# Patient Record
Sex: FEMALE | Race: WHITE | NOT HISPANIC OR LATINO
[De-identification: names, ages, dates, MRNs, and addresses within clinical notes are randomized per-mention and may not be internally consistent; named-entity substitution may affect disease eponyms.]

---

## 2019-09-30 ENCOUNTER — RECORDS - HEALTHEAST (OUTPATIENT)
Dept: ADMINISTRATIVE | Facility: OTHER | Age: 59
End: 2019-09-30

## 2019-09-30 ENCOUNTER — AMBULATORY - HEALTHEAST (OUTPATIENT)
Dept: SCHEDULING | Facility: CLINIC | Age: 59
End: 2019-09-30

## 2019-09-30 DIAGNOSIS — N95.9 UNSPECIFIED MENOPAUSAL AND PERIMENOPAUSAL DISORDER: ICD-10-CM

## 2019-09-30 DIAGNOSIS — Z78.0 POST-MENOPAUSAL: ICD-10-CM

## 2020-11-02 ENCOUNTER — ANESTHESIA - HEALTHEAST (OUTPATIENT)
Dept: SURGERY | Facility: HOSPITAL | Age: 60
End: 2020-11-02

## 2020-11-02 ENCOUNTER — RECORDS - HEALTHEAST (OUTPATIENT)
Dept: ADMINISTRATIVE | Facility: OTHER | Age: 60
End: 2020-11-02

## 2020-11-02 ENCOUNTER — SURGERY - HEALTHEAST (OUTPATIENT)
Dept: SURGERY | Facility: HOSPITAL | Age: 60
End: 2020-11-02

## 2020-11-02 ASSESSMENT — MIFFLIN-ST. JEOR
SCORE: 1081.64
SCORE: 1091.56

## 2020-11-03 ENCOUNTER — COMMUNICATION - HEALTHEAST (OUTPATIENT)
Dept: SCHEDULING | Facility: CLINIC | Age: 60
End: 2020-11-03

## 2020-11-06 ENCOUNTER — COMMUNICATION - HEALTHEAST (OUTPATIENT)
Dept: UROLOGY | Facility: CLINIC | Age: 60
End: 2020-11-06

## 2020-11-06 ENCOUNTER — AMBULATORY - HEALTHEAST (OUTPATIENT)
Dept: SCHEDULING | Facility: CLINIC | Age: 60
End: 2020-11-06

## 2020-11-06 DIAGNOSIS — B99.9 INFECTION: ICD-10-CM

## 2020-11-06 DIAGNOSIS — B96.29 UTI DUE TO EXTENDED-SPECTRUM BETA LACTAMASE (ESBL) PRODUCING ESCHERICHIA COLI: ICD-10-CM

## 2020-11-06 DIAGNOSIS — Z16.12 UTI DUE TO EXTENDED-SPECTRUM BETA LACTAMASE (ESBL) PRODUCING ESCHERICHIA COLI: ICD-10-CM

## 2020-11-06 DIAGNOSIS — N39.0 UTI DUE TO EXTENDED-SPECTRUM BETA LACTAMASE (ESBL) PRODUCING ESCHERICHIA COLI: ICD-10-CM

## 2020-11-09 ENCOUNTER — AMBULATORY - HEALTHEAST (OUTPATIENT)
Dept: PHARMACY | Facility: HOSPITAL | Age: 60
End: 2020-11-09

## 2020-11-09 ENCOUNTER — AMBULATORY - HEALTHEAST (OUTPATIENT)
Dept: UROLOGY | Facility: CLINIC | Age: 60
End: 2020-11-09

## 2020-11-09 ENCOUNTER — INFUSION - HEALTHEAST (OUTPATIENT)
Dept: INFUSION THERAPY | Facility: HOSPITAL | Age: 60
End: 2020-11-09

## 2020-11-09 ENCOUNTER — OFFICE VISIT - HEALTHEAST (OUTPATIENT)
Dept: OTHER | Facility: HOSPITAL | Age: 60
End: 2020-11-09

## 2020-11-09 DIAGNOSIS — B99.9 INFECTION: ICD-10-CM

## 2020-11-09 DIAGNOSIS — N39.0 URINARY TRACT INFECTION, SITE NOT SPECIFIED: ICD-10-CM

## 2020-11-09 DIAGNOSIS — N20.0 CALCULUS OF KIDNEY: ICD-10-CM

## 2020-11-12 ENCOUNTER — RECORDS - HEALTHEAST (OUTPATIENT)
Dept: LAB | Facility: CLINIC | Age: 60
End: 2020-11-12

## 2020-11-12 LAB
AST SERPL W P-5'-P-CCNC: 23 U/L (ref 0–40)
BASOPHILS # BLD AUTO: 0 THOU/UL (ref 0–0.2)
BASOPHILS NFR BLD AUTO: 0 % (ref 0–2)
BUN SERPL-MCNC: 11 MG/DL (ref 8–22)
C REACTIVE PROTEIN LHE: <0.1 MG/DL (ref 0–0.8)
CREAT SERPL-MCNC: 0.61 MG/DL (ref 0.6–1.1)
EOSINOPHIL # BLD AUTO: 0.2 THOU/UL (ref 0–0.4)
EOSINOPHIL NFR BLD AUTO: 2 % (ref 0–6)
ERYTHROCYTE [DISTWIDTH] IN BLOOD BY AUTOMATED COUNT: 11.4 % (ref 11–14.5)
ERYTHROCYTE [SEDIMENTATION RATE] IN BLOOD BY WESTERGREN METHOD: 22 MM/HR (ref 0–20)
GFR SERPL CREATININE-BSD FRML MDRD: >60 ML/MIN/1.73M2
HCT VFR BLD AUTO: 36.4 % (ref 35–47)
HGB BLD-MCNC: 12.1 G/DL (ref 12–16)
IMM GRANULOCYTES # BLD: 0 THOU/UL
IMM GRANULOCYTES NFR BLD: 0 %
LYMPHOCYTES # BLD AUTO: 2.1 THOU/UL (ref 0.8–4.4)
LYMPHOCYTES NFR BLD AUTO: 29 % (ref 20–40)
MCH RBC QN AUTO: 35.5 PG (ref 27–34)
MCHC RBC AUTO-ENTMCNC: 33.2 G/DL (ref 32–36)
MCV RBC AUTO: 107 FL (ref 80–100)
MONOCYTES # BLD AUTO: 0.6 THOU/UL (ref 0–0.9)
MONOCYTES NFR BLD AUTO: 8 % (ref 2–10)
NEUTROPHILS # BLD AUTO: 4.2 THOU/UL (ref 2–7.7)
NEUTROPHILS NFR BLD AUTO: 60 % (ref 50–70)
PLATELET # BLD AUTO: 201 THOU/UL (ref 140–440)
PMV BLD AUTO: 9.9 FL (ref 8.5–12.5)
RBC # BLD AUTO: 3.41 MILL/UL (ref 3.8–5.4)
WBC: 7 THOU/UL (ref 4–11)

## 2020-11-13 ENCOUNTER — AMBULATORY - HEALTHEAST (OUTPATIENT)
Dept: SURGERY | Facility: AMBULATORY SURGERY CENTER | Age: 60
End: 2020-11-13

## 2020-11-13 DIAGNOSIS — Z11.59 ENCOUNTER FOR SCREENING FOR OTHER VIRAL DISEASES: ICD-10-CM

## 2020-11-18 ENCOUNTER — AMBULATORY - HEALTHEAST (OUTPATIENT)
Dept: LAB | Facility: CLINIC | Age: 60
End: 2020-11-18

## 2020-11-18 DIAGNOSIS — Z11.59 ENCOUNTER FOR SCREENING FOR OTHER VIRAL DISEASES: ICD-10-CM

## 2020-11-19 ENCOUNTER — RECORDS - HEALTHEAST (OUTPATIENT)
Dept: LAB | Facility: CLINIC | Age: 60
End: 2020-11-19

## 2020-11-19 ENCOUNTER — ANESTHESIA - HEALTHEAST (OUTPATIENT)
Dept: SURGERY | Facility: AMBULATORY SURGERY CENTER | Age: 60
End: 2020-11-19

## 2020-11-19 LAB
AST SERPL W P-5'-P-CCNC: 20 U/L (ref 0–40)
BASOPHILS # BLD AUTO: 0.1 THOU/UL (ref 0–0.2)
BASOPHILS NFR BLD AUTO: 1 % (ref 0–2)
BUN SERPL-MCNC: 9 MG/DL (ref 8–22)
C REACTIVE PROTEIN LHE: <0.1 MG/DL (ref 0–0.8)
CREAT SERPL-MCNC: 0.53 MG/DL (ref 0.6–1.1)
EOSINOPHIL # BLD AUTO: 0.2 THOU/UL (ref 0–0.4)
EOSINOPHIL NFR BLD AUTO: 4 % (ref 0–6)
ERYTHROCYTE [DISTWIDTH] IN BLOOD BY AUTOMATED COUNT: 11.3 % (ref 11–14.5)
ERYTHROCYTE [SEDIMENTATION RATE] IN BLOOD BY WESTERGREN METHOD: 19 MM/HR (ref 0–20)
GFR SERPL CREATININE-BSD FRML MDRD: >60 ML/MIN/1.73M2
HCT VFR BLD AUTO: 37.3 % (ref 35–47)
HGB BLD-MCNC: 12.3 G/DL (ref 12–16)
IMM GRANULOCYTES # BLD: 0 THOU/UL
IMM GRANULOCYTES NFR BLD: 0 %
LYMPHOCYTES # BLD AUTO: 1.4 THOU/UL (ref 0.8–4.4)
LYMPHOCYTES NFR BLD AUTO: 29 % (ref 20–40)
MCH RBC QN AUTO: 34.8 PG (ref 27–34)
MCHC RBC AUTO-ENTMCNC: 33 G/DL (ref 32–36)
MCV RBC AUTO: 106 FL (ref 80–100)
MONOCYTES # BLD AUTO: 0.5 THOU/UL (ref 0–0.9)
MONOCYTES NFR BLD AUTO: 10 % (ref 2–10)
NEUTROPHILS # BLD AUTO: 2.8 THOU/UL (ref 2–7.7)
NEUTROPHILS NFR BLD AUTO: 57 % (ref 50–70)
PLATELET # BLD AUTO: 180 THOU/UL (ref 140–440)
PMV BLD AUTO: 10.3 FL (ref 8.5–12.5)
RBC # BLD AUTO: 3.53 MILL/UL (ref 3.8–5.4)
WBC: 4.9 THOU/UL (ref 4–11)

## 2020-11-19 ASSESSMENT — MIFFLIN-ST. JEOR: SCORE: 1077.1

## 2020-11-20 ENCOUNTER — SURGERY - HEALTHEAST (OUTPATIENT)
Dept: SURGERY | Facility: AMBULATORY SURGERY CENTER | Age: 60
End: 2020-11-20

## 2020-11-20 ENCOUNTER — COMMUNICATION - HEALTHEAST (OUTPATIENT)
Dept: SCHEDULING | Facility: CLINIC | Age: 60
End: 2020-11-20

## 2020-11-20 ASSESSMENT — MIFFLIN-ST. JEOR: SCORE: 1077.1

## 2020-11-25 ENCOUNTER — AMBULATORY - HEALTHEAST (OUTPATIENT)
Dept: UROLOGY | Facility: CLINIC | Age: 60
End: 2020-11-25

## 2020-11-30 ENCOUNTER — RECORDS - HEALTHEAST (OUTPATIENT)
Dept: ADMINISTRATIVE | Facility: OTHER | Age: 60
End: 2020-11-30

## 2020-11-30 ENCOUNTER — COMMUNICATION - HEALTHEAST (OUTPATIENT)
Dept: SCHEDULING | Facility: CLINIC | Age: 60
End: 2020-11-30

## 2020-11-30 ENCOUNTER — COMMUNICATION - HEALTHEAST (OUTPATIENT)
Dept: UROLOGY | Facility: CLINIC | Age: 60
End: 2020-11-30

## 2020-12-17 ENCOUNTER — OFFICE VISIT - HEALTHEAST (OUTPATIENT)
Dept: UROLOGY | Facility: CLINIC | Age: 60
End: 2020-12-17

## 2020-12-17 ENCOUNTER — COMMUNICATION - HEALTHEAST (OUTPATIENT)
Dept: UROLOGY | Facility: CLINIC | Age: 60
End: 2020-12-17

## 2020-12-17 DIAGNOSIS — N20.1 CALCULUS OF URETER: ICD-10-CM

## 2020-12-17 DIAGNOSIS — N30.01 ACUTE CYSTITIS WITH HEMATURIA: ICD-10-CM

## 2020-12-17 RX ORDER — AZITHROMYCIN 500 MG/1
TABLET, FILM COATED ORAL
Status: SHIPPED | COMMUNITY
Start: 2020-12-16

## 2021-01-21 ENCOUNTER — AMBULATORY - HEALTHEAST (OUTPATIENT)
Dept: LAB | Facility: HOSPITAL | Age: 61
End: 2021-01-21

## 2021-01-21 DIAGNOSIS — N30.01 ACUTE CYSTITIS WITH HEMATURIA: ICD-10-CM

## 2021-01-21 DIAGNOSIS — N20.1 CALCULUS OF URETER: ICD-10-CM

## 2021-01-23 LAB
BACTERIA SPEC CULT: ABNORMAL
BACTERIA SPEC CULT: ABNORMAL

## 2021-01-25 ENCOUNTER — COMMUNICATION - HEALTHEAST (OUTPATIENT)
Dept: UROLOGY | Facility: CLINIC | Age: 61
End: 2021-01-25

## 2021-04-07 ENCOUNTER — AMBULATORY - HEALTHEAST (OUTPATIENT)
Dept: PHARMACY | Facility: HOSPITAL | Age: 61
End: 2021-04-07

## 2021-05-24 ENCOUNTER — RECORDS - HEALTHEAST (OUTPATIENT)
Dept: ADMINISTRATIVE | Facility: CLINIC | Age: 61
End: 2021-05-24

## 2021-05-25 ENCOUNTER — RECORDS - HEALTHEAST (OUTPATIENT)
Dept: ADMINISTRATIVE | Facility: CLINIC | Age: 61
End: 2021-05-25

## 2021-05-26 ENCOUNTER — RECORDS - HEALTHEAST (OUTPATIENT)
Dept: ADMINISTRATIVE | Facility: CLINIC | Age: 61
End: 2021-05-26

## 2021-05-26 VITALS
OXYGEN SATURATION: 96 % | RESPIRATION RATE: 18 BRPM | DIASTOLIC BLOOD PRESSURE: 50 MMHG | TEMPERATURE: 98.1 F | HEART RATE: 74 BPM | SYSTOLIC BLOOD PRESSURE: 94 MMHG

## 2021-05-27 ENCOUNTER — RECORDS - HEALTHEAST (OUTPATIENT)
Dept: ADMINISTRATIVE | Facility: CLINIC | Age: 61
End: 2021-05-27

## 2021-06-02 ENCOUNTER — RECORDS - HEALTHEAST (OUTPATIENT)
Dept: ADMINISTRATIVE | Facility: CLINIC | Age: 61
End: 2021-06-02

## 2021-06-04 VITALS — HEIGHT: 62 IN | BODY MASS INDEX: 22.85 KG/M2 | WEIGHT: 124.19 LBS

## 2021-06-05 VITALS — BODY MASS INDEX: 22.26 KG/M2 | WEIGHT: 121 LBS | HEIGHT: 62 IN

## 2021-06-12 NOTE — ANESTHESIA PREPROCEDURE EVALUATION
Anesthesia Evaluation      Patient summary reviewed   No history of anesthetic complications     Airway   Mallampati: II  Neck ROM: full   Pulmonary - normal exam                          Cardiovascular - normal exam   Neuro/Psych      Endo/Other       GI/Hepatic/Renal    (+)   chronic renal disease,           Dental - normal exam                        Anesthesia Plan  Planned anesthetic: MAC    ASA 2     Anesthetic plan and risks discussed with: patient  Anesthesia plan special considerations: antiemetics,   Post-op plan: routine recovery

## 2021-06-12 NOTE — ANESTHESIA POSTPROCEDURE EVALUATION
Patient: Valentina Whelan  Procedure(s):  CYSTOSCOPY, WITH URETERAL STENT INSERTION (Left)  Anesthesia type: No value filed.    Patient location: PACU  Last vitals:   Vitals Value Taken Time   /49 11/02/20 0911   Temp 36.4  C (97.6  F) 11/02/20 0911   Pulse 72 11/02/20 0911   Resp 18 11/02/20 0911   SpO2 100 % 11/02/20 0911     Post vital signs: stable  Level of consciousness: awake and responds to simple questions  Post-anesthesia pain: pain controlled  Post-anesthesia nausea and vomiting: no  Pulmonary: unassisted, return to baseline  Cardiovascular: stable and blood pressure at baseline  Hydration: adequate  Anesthetic events: no    QCDR Measures:  ASA# 11 - Violeta-op Cardiac Arrest: ASA11B - Patient did NOT experience unanticipated cardiac arrest  ASA# 12 - Violeta-op Mortality Rate: ASA12B - Patient did NOT die  ASA# 13 - PACU Re-Intubation Rate: ASA13B - Patient did NOT require a new airway mgmt  ASA# 10 - Composite Anes Safety: ASA10A - No serious adverse event    Additional Notes:

## 2021-06-12 NOTE — PATIENT INSTRUCTIONS - HE
Home care will call you regarding setting up the home antibiotics, , call MD with any concerns, questions   Indu Emmanuel

## 2021-06-12 NOTE — ANESTHESIA CARE TRANSFER NOTE
Last vitals:   Vitals:    11/02/20 0853   BP: 101/49   Pulse: 91   Resp: 10   Temp: 37  C (98.6  F)   SpO2: 99%     Patient's level of consciousness is awake  Spontaneous respirations: yes  Maintains airway independently: yes  Dentition unchanged: yes  Oropharynx: oropharynx clear of all foreign objects    QCDR Measures:  ASA# 20 - Surgical Safety Checklist: WHO surgical safety checklist completed prior to induction    PQRS# 430 - Adult PONV Prevention: 4558F - Pt received => 2 anti-emetic agents (different classes) preop & intraop  ASA# 8 - Peds PONV Prevention: 4558F - Pt received => 2 anti-emetic agents (different classes) preop & intraop  PQRS# 424 - Violeta-op Temp Management: 4559F - At least one body temp DOCUMENTED => 35.5C or 95.9F within required timeframe  PQRS# 426 - PACU Transfer Protocol: - Transfer of care checklist used  ASA# 14 - Acute Post-op Pain: ASA14B - Patient did NOT experience pain >= 7 out of 10     Patient moved self from OR bed to cart.  No oxygen needed.  VSS.  Patient alert and oriented.  Report given to floor RN, no further questions.  Will transport patient with aid to floor room.  Angy Best

## 2021-06-12 NOTE — PROGRESS NOTES
Pt arrived amb for her 1st dose of meropenem , Went over today's plan of care, questions, concerns,. Pt has a UTI, but also deals with chronic lyme's disease, Iv started in lt hand, pt BP low but stable , Pt infused with meropenem, kristyn well, PT was given ns, IV dcd after Home care called with the update, Pt was brought by w/c to the Xray to have her PICC line put in,at 1415  Indu Emmanuel

## 2021-06-12 NOTE — TELEPHONE ENCOUNTER
Will need to start patient on IV merrem given finalized urine culture results which grew 2 strains of ESBL e.coli    Will stop current keflex as it is ineffective    Orders initiated for PICC insertion, infusion through [facility] and antibiotic dosing.

## 2021-06-12 NOTE — TELEPHONE ENCOUNTER
Patient was informed of culture results and need to change to IV antibiotic.     She provided verbal consent to insertion of a PICC line to start infusion of IV meropenem for ESBL e. Coli UTI.    Will send a consent via fax to [facility] if indicated.

## 2021-06-13 NOTE — ANESTHESIA CARE TRANSFER NOTE
Last vitals:   Vitals:    11/20/20 0852   BP: 109/53   Pulse: 64   Resp: 16   Temp: 37.1  C (98.7  F)   SpO2: 100%     Patient's level of consciousness is drowsy  Spontaneous respirations: yes  Maintains airway independently: yes  Dentition unchanged: yes  Oropharynx: oropharynx clear of all foreign objects    QCDR Measures:  ASA# 20 - Surgical Safety Checklist: WHO surgical safety checklist completed prior to induction    PQRS# 430 - Adult PONV Prevention: 4558F - Pt received => 2 anti-emetic agents (different classes) preop & intraop  ASA# 8 - Peds PONV Prevention: NA - Not pediatric patient, not GA or 2 or more risk factors NOT present  PQRS# 424 - Violeta-op Temp Management: 4559F - At least one body temp DOCUMENTED => 35.5C or 95.9F within required timeframe  PQRS# 426 - PACU Transfer Protocol: - Transfer of care checklist used  ASA# 14 - Acute Post-op Pain: ASA14B - Patient did NOT experience pain >= 7 out of 10     Volatile agents turned off, pt breathing spontaneously with adequate tidal volumes, following commands, LMA removed without issue. Transported by CRNA to recovery on 10LPM O2 by face mask.

## 2021-06-13 NOTE — ANESTHESIA POSTPROCEDURE EVALUATION
Patient: Valentina Whelan  Procedure(s):  CYSTOURETEROSCOPY, WITH STENT EXTRACTION, HOLMIUM LASER LITHOTRIPSY OF URETERAL CALCULUS, AND STENT INSERTION (Left)  Anesthesia type: general    Patient location: Phase II Recovery  Last vitals:   Vitals Value Taken Time   /54 11/20/20 0900   Temp 37.1  C (98.7  F) 11/20/20 0852   Pulse 68 11/20/20 0910   Resp 16 11/20/20 0900   SpO2 100 % 11/20/20 0910   Vitals shown include unvalidated device data.  Post vital signs: stable  Level of consciousness: awake and responds to simple questions  Post-anesthesia pain: pain controlled  Post-anesthesia nausea and vomiting: no  Pulmonary: unassisted, return to baseline  Cardiovascular: stable and blood pressure at baseline  Hydration: adequate  Anesthetic events: no    QCDR Measures:  ASA# 11 - Violeta-op Cardiac Arrest: ASA11B - Patient did NOT experience unanticipated cardiac arrest  ASA# 12 - Violeta-op Mortality Rate: ASA12B - Patient did NOT die  ASA# 13 - PACU Re-Intubation Rate: ASA13B - Patient did NOT require a new airway mgmt  ASA# 10 - Composite Anes Safety: ASA10A - No serious adverse event    Additional Notes:

## 2021-06-13 NOTE — PROGRESS NOTES
"Assessment/Plan:        Diagnoses and all orders for this visit:    Calculus of ureter  -     Culture, Urine- Future; Future; Expected date: 01/16/2021    Acute cystitis with hematuria  -     Culture, Urine- Future; Future; Expected date: 01/16/2021    Other orders  -     azithromycin (ZITHROMAX) 500 MG tablet      Stone Management Plan  [facility] Stone Management 12/17/2020   Urinary Tract Infection No suspicion of infection   Renal Colic Asymptomatic at this time   Renal Failure No suspicion of renal failure   R Stone Event No current event   L Stone Event Resolved event   Resolved date 12/17/2020   Post-op status No imaging             Phone call duration: 13 minutes    Shin Soni MD     Subjective:      The patient has been notified of following:     \"This telephone visit will be conducted via a call between you and your physician/provider. We have found that certain health care needs can be provided without the need for a physical exam.  This service lets us provide the care you need with a short phone conversation.  If a prescription is necessary we can send it directly to your pharmacy.  If labs and/or imaging are needed, we can place orders so you can have the test (s) done at a later time.    If during the course of the call the physician/provider feels a telephone visit is not appropriate, you will not be charged for this service.\"     HPI  Ms. Valentina Whelan is a 60 y.o.  female who is being evaluated via a billable telephone visit by [facility] for late postoperative follow-up.     She returns status post Left ureteroscopic laser lithotripsy for distal ureteral stone. She has had no unanticipated post-operative events.     She is asymptomatic at present. She denies symptoms of fever, chills, flank pain, nausea, vomiting, urinary frequency and dysuria. She has had a flare of her chronic Lymes disease.    Imaging was not performed today because of confident " clearance.     Stone composition was 76 % calcium oxalate and 20 % calcium phosphate (apatite).     She is a low risk remotely recurrent stone former and was educated on conservative strategies for risk reduction. Will check a urine culture in a month because of recent ESBL UTI.       ROS   Review of systems is negative except for HPI.    Past Medical History:   Diagnosis Date     History of anesthesia complications     slow to wake up after all surgeries       Past Surgical History:   Procedure Laterality Date     CHG X-RAY RETROGRADE PYELOGRAM Left 11/20/2020    Procedure: CYSTOURETEROSCOPY, WITH STENT EXTRACTION, HOLMIUM LASER LITHOTRIPSY OF URETERAL CALCULUS, AND STENT INSERTION;  Surgeon: Shin Soni MD;  Location: [facility];  Service: Urology     CHOLECYSTECTOMY       PICC MIDLINE INSERTION  11/9/2020          TX CYSTOSCOPY,INSERT URETERAL STENT Left 11/2/2020    Procedure: CYSTOSCOPY, WITH URETERAL STENT INSERTION, LEFT;  Surgeon: Shin Soni MD;  Location: [facility];  Service: Urology       Current Outpatient Medications   Medication Sig Dispense Refill     naltrexone HCl (NALTREXONE ORAL) Take 3 mg by mouth at bedtime.       progesterone (PROMETRIUM) 100 MG capsule Take 100 mg by mouth at bedtime.       UNABLE TO FIND Patient takes supplements including vitamin D, C, zinc and many others       azithromycin (ZITHROMAX) 500 MG tablet        EPINEPHrine (EPIPEN) 0.3 mg/0.3 mL atIn Inject 0.3 mL (0.3 mg total) into the shoulder, thigh, or buttocks as needed. 2 Pre-filled Pen Syringe 0     valACYclovir (VALTREX) 1000 MG tablet Take 1,000 mg by mouth 2 (two) times a day.       No current facility-administered medications for this visit.        Allergies   Allergen Reactions     Aspirin Anaphylaxis     Codeine Anaphylaxis     Tolerated hydromorphone 11/2/2020     Monosodium Glutamate      Nsaids (Non-Steroidal Anti-Inflammatory Drug) Anaphylaxis     Latex      Shortness of breath         Social History     Socioeconomic History     Marital status: Single     Spouse name: Not on file     Number of children: Not on file     Years of education: Not on file     Highest education level: Not on file   Occupational History     Not on file   Social Needs     Financial resource strain: Not on file     Food insecurity     Worry: Not on file     Inability: Not on file     Transportation needs     Medical: Not on file     Non-medical: Not on file   Tobacco Use     Smoking status: Never Smoker     Smokeless tobacco: Never Used   Substance and Sexual Activity     Alcohol use: Never     Frequency: Never     Binge frequency: Never     Drug use: Never     Sexual activity: Not Currently   Lifestyle     Physical activity     Days per week: 2 days     Minutes per session: 10 min     Stress: Not at all   Relationships     Social connections     Talks on phone: Not on file     Gets together: Not on file     Attends Christian service: Not on file     Active member of club or organization: Not on file     Attends meetings of clubs or organizations: Not on file     Relationship status: Not on file     Intimate partner violence     Fear of current or ex partner: Not on file     Emotionally abused: Not on file     Physically abused: Not on file     Forced sexual activity: Not on file   Other Topics Concern     Not on file   Social History Narrative     Not on file       No family history on file.    Objective:      Labs   Urinalysis POC (Office):  Nitrite, UA   Date Value Ref Range Status   11/28/2020 Negative Negative Final   11/02/2020 Positive (!) Negative Final   10/17/2010 Negative (Negative) Final       Lab Urinalysis:  Blood, UA   Date Value Ref Range Status   11/28/2020 Negative Negative Final   11/02/2020 Large (!) Negative Final   10/17/2010 Negative (Negative) Final     Nitrite, UA   Date Value Ref Range Status   11/28/2020 Negative Negative Final   11/02/2020 Positive (!) Negative Final   10/17/2010 Negative  (Negative) Final     Leukocytes, UA   Date Value Ref Range Status   11/28/2020 Negative Negative Final   11/02/2020 Large (!) Negative Final   10/17/2010 Small (!) (Negative) Final     pH, UA   Date Value Ref Range Status   11/28/2020 6.5 4.5 - 8.0 Final   11/02/2020 5.5 4.5 - 8.0 Final   10/17/2010 5.5 4.5 - 8.0 Final    and Acute Labs   Urine Culture    Culture   Date Value Ref Range Status   11/02/2020 (!)  Final    50,000-100,000 col/ml ESBL producing Escherichia coli   11/02/2020 (!)  Final    50,000-100,000 col/ml ESBL producing Escherichia coli     Comment:     Second strain   11/02/2020 >100,000 col/ml ESBL producing Escherichia coli (!)  Final

## 2021-06-13 NOTE — TELEPHONE ENCOUNTER
Spoke with pt to let her know that she can go to [facility] outpatient lab in approx 1 month for a uc. Nurse will call with results.

## 2021-06-13 NOTE — ANESTHESIA PREPROCEDURE EVALUATION
Anesthesia Evaluation      Patient summary reviewed   History of anesthetic complications (SLOW TO WAKE UP)     Airway   Mallampati: II  Neck ROM: full   Pulmonary - normal exam                          Cardiovascular - normal exam  Exercise tolerance: > or = 4 METS  ECG reviewed (NS[facility])        Neuro/Psych      Endo/Other       GI/Hepatic/Renal    (+)   chronic renal disease,      Other findings: Results for VIVI GONG (MRN [id number]) as of 11/20/2020 07:12    11/18/2020 10:59  2019-nCOV: Not Detected    Results for VIVI GONG (MRN [id number]) as of 11/20/2020 07:21    11/19/2020 09:25  BUN: 9  Creatinine: 0.53 (L)  GFR MDRD Af Amer: >60  GFR MDRD Non Af Amer: >60  AST: 20  CRP: <0.1  WBC: 4.9  RBC: 3.53 (L)  Hemoglobin: 12.3  Hematocrit: 37.3  MCV: 106 (H)  MCH: 34.8 (H)  MCHC: 33.0  RDW: 11.3  Platelets: 180        Dental - normal exam                          Anesthesia Plan  Planned anesthetic: general LMA    ASA 2     Anesthetic plan and risks discussed with: patient  Anesthesia plan special considerations: antiemetics,   Post-op plan: routine recovery

## 2021-06-13 NOTE — PROGRESS NOTES
Message left for patient regarding her upcoming self stent removal for tomorrow.  Patient to call with any questions.  Candy Sidhu RN

## 2021-06-13 NOTE — TELEPHONE ENCOUNTER
Spoke with patient regarding her emergency room visit from this weekend.  She states that she is feeling a little better and has no fever.  She is fatigued, no chills.  Her PICC line is still in, blood cultures are still pending.  Candy Sidhu RN    Checked in with patient, who states that she is feeling a bit better.  Less fatigue and no chills or fever.  Blood cultures are still pending, and per lab, they can take another 1-2 days to result.  Patient will call with any issues in the interim.  Candy Sidhu RN    Blood cultures have returned with no growth, so [facility] was called to have patient's PICC line removed.  They will call her to set this up.  Spoke with patient who is doing well and was advised of this.  Provider is also aware of results and PICC removal.  Candy Sidhu RN

## 2021-06-14 NOTE — TELEPHONE ENCOUNTER
Message left for patient to call [facility] regarding her urine culture results.  Candy Sidhu RN    Patient returned call and was updated on results.  She is feeling well and will call with any issues.  Candy Sidhu RN

## 2021-06-16 PROBLEM — N20.0 CALCULUS OF KIDNEY: Status: ACTIVE | Noted: 2020-11-13

## 2021-06-16 PROBLEM — N39.0 URINARY TRACT INFECTION, SITE NOT SPECIFIED: Status: ACTIVE | Noted: 2020-11-09

## 2021-06-16 PROBLEM — N20.0 KIDNEY STONE: Status: ACTIVE | Noted: 2020-11-02

## 2021-06-16 PROBLEM — N30.01 ACUTE CYSTITIS WITH HEMATURIA: Status: ACTIVE | Noted: 2020-11-02

## 2021-06-16 PROBLEM — N20.1 CALCULUS OF URETER: Status: ACTIVE | Noted: 2020-11-02

## 2021-06-27 ENCOUNTER — HEALTH MAINTENANCE LETTER (OUTPATIENT)
Age: 61
End: 2021-06-27

## 2021-07-01 NOTE — PROCEDURES
"Procedures by Nelia Irene, RN at 11/9/2020  2:30 PM     Author: Nelia Irene, RN Service: Critical Care Medicine Author Type: Registered Nurse    Filed: 11/9/2020  3:16 PM Encounter Date: 11/9/2020 Status: Signed    : Nelia Irene RN (Registered Nurse)     Procedures    1. PICC MIDLINE INSERTION [[id number] (Custom)]             PICC Line Insertion Procedure Note  Pt. Name: Valentina Whelan  MRN:        [id number]    Procedure: Insertion of a  singcristel Lumen  4 fr  Bard SOLO (valved) Power PICC, Lot number [id number]    Indications: antibiotics    Contraindications : none    Procedure Details   Patient identified with 2 identifiers and \"Time Out\" conducted.  .     Central line insertion bundle followed: hand hygeine performed prior to procedure, site cleansed with cholraprep, hat, mask, sterile gloves,sterile gown worn, patient draped with maximum barrier head to toe drape, sterile field maintained.    The vein was assessed and found to be compressible and of adequate size. 2 ml 1% Lidocaine administered sq to the insertion site. A 4 Fr PICC was inserted into the basilic vein of the right arm with ultrasound guidance. 1 attempt(s) required to access vein.   Catheter threaded without difficulty. Good blood return noted.    Modified Seldinger Technique used for insertion.    The 8 sharps that are included in the PICC insertion kit were accounted for and disposed of in the sharps container prior to breakdown of the sterile field.    Catheter secured with Statlock, biopatch and Tegaderm dressing applied.    Findings:  Total catheter length  41 cm, with 1 cm exposed. Mid upper arm circumference is 29 cm. Catheter was flushed with 10 cc NS. Patient  tolerated procedure well.    Tip placement verified by ecg.. Tip placement in the SVC arnaldo CAJ.    CLABSI prevention brochure left at bedside.    Patient's primary RN notified PICC is ready for use.    Comments:          Nelia Irene, " RN,BSN  [facility]

## 2021-10-17 ENCOUNTER — HEALTH MAINTENANCE LETTER (OUTPATIENT)
Age: 61
End: 2021-10-17

## 2022-07-23 ENCOUNTER — HEALTH MAINTENANCE LETTER (OUTPATIENT)
Age: 62
End: 2022-07-23

## 2022-10-01 ENCOUNTER — HEALTH MAINTENANCE LETTER (OUTPATIENT)
Age: 62
End: 2022-10-01

## 2023-08-12 ENCOUNTER — HEALTH MAINTENANCE LETTER (OUTPATIENT)
Age: 63
End: 2023-08-12

## 2025-01-18 ENCOUNTER — HEALTH MAINTENANCE LETTER (OUTPATIENT)
Age: 65
End: 2025-01-18